# Patient Record
Sex: FEMALE | Race: BLACK OR AFRICAN AMERICAN | Employment: OTHER | URBAN - METROPOLITAN AREA
[De-identification: names, ages, dates, MRNs, and addresses within clinical notes are randomized per-mention and may not be internally consistent; named-entity substitution may affect disease eponyms.]

---

## 2017-06-08 PROBLEM — N32.81 OAB (OVERACTIVE BLADDER): Status: ACTIVE | Noted: 2017-06-08

## 2019-02-20 ENCOUNTER — HOSPITAL ENCOUNTER (OUTPATIENT)
Dept: LAB | Age: 39
Discharge: HOME | End: 2019-02-20
Payer: COMMERCIAL

## 2019-02-20 PROCEDURE — 88305 TISSUE EXAM BY PATHOLOGIST: CPT

## 2022-03-16 ENCOUNTER — OFFICE VISIT (OUTPATIENT)
Dept: NEUROLOGY | Age: 42
End: 2022-03-16
Payer: COMMERCIAL

## 2022-03-16 VITALS
BODY MASS INDEX: 36.25 KG/M2 | HEART RATE: 106 BPM | HEIGHT: 62 IN | WEIGHT: 197 LBS | OXYGEN SATURATION: 98 % | DIASTOLIC BLOOD PRESSURE: 80 MMHG | RESPIRATION RATE: 18 BRPM | SYSTOLIC BLOOD PRESSURE: 120 MMHG

## 2022-03-16 DIAGNOSIS — Z86.69 H/O IMPACTED CERUMEN: ICD-10-CM

## 2022-03-16 DIAGNOSIS — R51.9 HEADACHE DISORDER: Primary | ICD-10-CM

## 2022-03-16 PROCEDURE — 99203 OFFICE O/P NEW LOW 30 MIN: CPT | Performed by: NURSE PRACTITIONER

## 2022-03-16 RX ORDER — LOSARTAN POTASSIUM 50 MG/1
TABLET ORAL
COMMUNITY
Start: 2022-01-29

## 2022-03-16 RX ORDER — AMLODIPINE BESYLATE 5 MG/1
TABLET ORAL
COMMUNITY
Start: 2022-01-29

## 2022-03-16 RX ORDER — AMLODIPINE BESYLATE 5 MG/1
1 TABLET ORAL DAILY
COMMUNITY
End: 2022-03-16 | Stop reason: SDUPTHER

## 2022-03-16 NOTE — PROGRESS NOTES
[employer]  [address] [facility], [address], [facility], [address]  [address] Suresh Molina, [address]  Office:  [phone]  Fax: [phone]    Referring: [facility]    Chief Complaint   Patient presents with    Headache    New Patient       HPI:    This is a 43year old female who presents for new patient evaluation headaches. Onset of headache began December 27th. Denies inciting factor or injury. She presented to the ER shortly afterwards and had negative workup. Was told this was likely viral related. At that time she had a negative COVID test. Reports having a normal eye exam. She then went to ENT where she was found to have cerumen impaction. This was causing some imbalance. After removal she began to feel better. Headaches eased up and then returned. She eventually re tested for COVID and this was positive in January. She saw her primary car provider who offered a neurology referral. Today she endorses being headache free. Denies history of headaches. She was riding in the car coming back from MD and started to feel dizzy. She took Meclizine with no improvement. Denied headaches, visual disturbances, ringing in the ears, hearing loss, numbness, or weakness. Overall she feels well. She takes Aleve as needed for sporadic headaches with improvement. She takes hydroxyzine for itch. This also helps with sleep. No reported depression/anxiety. Denies tobacco use. No other concerns at this time.       Social History     Socioeconomic History    Marital status:      Spouse name: Not on file    Number of children: Not on file    Years of education: Not on file    Highest education level: Not on file   Occupational History    Not on file   Tobacco Use    Smoking status: Never Smoker    Smokeless tobacco: Never Used   Substance and Sexual Activity    Alcohol use: No    Drug use: No    Sexual activity: Yes     Partners: Male   Other Topics Concern    Not on file   Social History Narrative    Not on file     Social Determinants of Health     Financial Resource Strain:     Difficulty of Paying Living Expenses: Not on file   Food Insecurity:     Worried About Running Out of Food in the Last Year: Not on file    Bessie of Food in the Last Year: Not on file   Transportation Needs:     Lack of Transportation (Medical): Not on file    Lack of Transportation (Non-Medical):  Not on file   Physical Activity:     Days of Exercise per Week: Not on file    Minutes of Exercise per Session: Not on file   Stress:     Feeling of Stress : Not on file   Social Connections:     Frequency of Communication with Friends and Family: Not on file    Frequency of Social Gatherings with Friends and Family: Not on file    Attends Ca[employer] Services: Not on file    Active Member of [address] [place] [address] [employer] or Organizations: Not on file    Attends Club or Organization Meetings: Not on file    Marital Status: Not on file   Intimate Partner Violence:     Fear of Current or Ex-Partner: Not on file    Emotionally Abused: Not on file    Physically Abused: Not on file    Sexually Abused: Not on file   Housing Stability:     Unable to Pay for Housing in the Last Year: Not on file    Number of [place] in the Last Year: Not on file    Unstable Housing in the Last Year: Not on file       Family History   Problem Relation Age of Onset    Hypertension Mother     Hypertension Father        Current Outpatient Medications   Medication Sig Dispense Refill    amLODIPine (NORVASC) 5 mg tablet take 1 tablet by mouth once daily 90      losartan (COZAAR) 50 mg tablet take 1 tablet by mouth once daily for blood pressure 90         Past Medical History:   Diagnosis Date    Dysuria     Frequency of micturition     Hypertension     Incomplete bladder emptying     Microhematuria     Nocturia     OAB (overactive bladder)     Urgency of urination     Urinary frequency        Past Surgical History: Procedure Laterality Date    HX GYN      c section; termination    HX HEENT      WISDOM TEETH EXTRACTED       Allergies   Allergen Reactions    Eggshell Membrane Anaphylaxis    Seafood Hives       Patient Active Problem List   Diagnosis Code    OAB (overactive bladder) N32.81    Urinary frequency R35.0    Hypertension I10    Dysuria R30.0    Frequency of micturition R35.0    Nocturia R35.1    Urgency of urination R39.15         Review of Systems:   Constitutional: no fever or chills  Skin denies rash or itching  HEENT:  Denies tinnitus, hearing loss, or visual changes  Respiratory: denies shortness of breath  Cardiovascular: denies chest pain, dyspnea on exertion  Gastrointestinal: does not report nausea or vomiting  Genitourinary: does not report dysuria or incontinence  Musculoskeletal: does not report joint pain or swelling  Endocrine: denies weight change  Hematology: denies easy bruising or bleeding   Neurological: as above in HPI      PHYSICAL EXAMINATION:      VITAL SIGNS:    Visit Vitals  /80   Pulse (!) 106   Resp 18   Ht 5' 2\" (1.575 m)   Wt 89.4 kg (197 lb)   SpO2 98%   BMI 36.03 kg/m²       GENERAL: Well developed, well nourished, in no apparent distress. HEART: RR, no murmurs heard, no carotid bruits  LUNGS:                      CTAB  EXTREMITIES: No clubbing, cyanosis, or edema is identified. Pulses 2+ and symmetrical.  HEAD:   Normocephalic, atraumatic. NEUROLOGIC EXAMINATION    MENTAL STATUS: Awake, alert, and oriented x 4. Attention and STM are grossly normal. There is no aphasia. Fund of knowledge is adequate. Mood and affect are appropriate  CRANIAL NERVES: Visual fields are full to confrontation. No fundus anomalies observed. Pupils are reactive to light and accommodation. Extraocular movements are intact and there is no nystagmus. Facial sensation is normal  Face is symmetrical.   Hearing is grossly intact. SCM/TPZ 5/5  Palate rises symmetrically.  Tongue is in the midline. MOTOR:   Normal tone, bulk, and strength, 5/5 muscle strength throughout. No cogwheel rigidity or clonus present. CEREBELLAR: Finger to nose was normal.   No tremors or dysmetria    SENSORY:  Grossly unremarkable. DTR's:   +2 throughout, toes downgoing     GAIT:   Normal gait, able to tandem walk        I have personally reviewed imaging studies. ED CT HEAD NO CONTRAST    Anatomical Region Laterality Modality   Head -- Computed Tomography       Impression  Performed by RADIOLOGY    Unremarkable CT scan of the head without contrast.     Signed By: Azeb Rodriguez MD on 1/19/2022 12:44 PM      Impression/Plan  Gianfranco Jackson is a 43 y.o. female whose history and physical are consistent with headache disorder likely secondary to virus. Here for new patient evaluation of headaches. Denies headaches today. She had negative workup including unremarkable head CT. Her exam in non focal.  No history of headaches previous to end of December. At this time no further workup or medication adjustments. BP controlled today at 120/80. She will follow up with her PCP as indicated. She will follow up on an as needed basis. All questions addressed and patient is agreeable with plan of care. Diagnoses and all orders for this visit:    1. Headache disorder    2. H/O impacted cerumen        I spent 35 minutes with the patient in face-to-face consultation, with 20 minutes spent in counseling and coordination of care as described above. Signed By: Sierra Mera NP              PLEASE NOTE:   Portions of this document may have been produced using voice recognition software. Unrecognized errors in transcription may be present.

## 2022-03-16 NOTE — PROGRESS NOTES
Mara Figueroa presents today for   Chief Complaint   Patient presents with    Headache    New Patient       Is someone accompanying this pt? no    Is the patient using any DME equipment during OV? no    Depression Screening:  No flowsheet data found. Learning Assessment:  No flowsheet data found. Abuse Screening:  No flowsheet data found. Fall Risk  No flowsheet data found. Coordination of Care:  1. Have you been to the ER, urgent care clinic since your last visit? Hospitalized since your last visit? no    2. Have you seen or consulted any other health care providers outside of the [address] [place] [address] since your last visit? Include any pap smears or colon screening.  pradeep

## 2022-03-20 PROBLEM — N32.81 OAB (OVERACTIVE BLADDER): Status: ACTIVE | Noted: 2017-06-08

## 2023-08-01 ENCOUNTER — OFFICE VISIT (OUTPATIENT)
Age: 43
End: 2023-08-01
Payer: COMMERCIAL

## 2023-08-01 VITALS
SYSTOLIC BLOOD PRESSURE: 126 MMHG | HEIGHT: 62 IN | RESPIRATION RATE: 18 BRPM | WEIGHT: 206.4 LBS | HEART RATE: 84 BPM | DIASTOLIC BLOOD PRESSURE: 78 MMHG | OXYGEN SATURATION: 98 % | TEMPERATURE: 98.4 F | BODY MASS INDEX: 37.98 KG/M2

## 2023-08-01 DIAGNOSIS — R56.9 SEIZURE (HCC): ICD-10-CM

## 2023-08-01 DIAGNOSIS — G47.30 SLEEP DISORDER BREATHING: Primary | ICD-10-CM

## 2023-08-01 DIAGNOSIS — R20.2 PARESTHESIAS: ICD-10-CM

## 2023-08-01 PROCEDURE — 99203 OFFICE O/P NEW LOW 30 MIN: CPT | Performed by: PSYCHIATRY & NEUROLOGY

## 2023-08-01 PROCEDURE — 3078F DIAST BP <80 MM HG: CPT | Performed by: PSYCHIATRY & NEUROLOGY

## 2023-08-01 PROCEDURE — 3074F SYST BP LT 130 MM HG: CPT | Performed by: PSYCHIATRY & NEUROLOGY

## 2023-08-01 NOTE — PROGRESS NOTES
HISTORY AND EXAM  37year old female after the the [facility] in 2021 feel pressure in head, was bad before but not gone away, was seen by neurology in 2021 for that but was symptom free that time, after that started again with headaches for few minutes without associated symptoms, sometimes get peppery taste in mouth happened 3 times but lasted for few hours, have reflux problem as well, patient unable to describe her feeling but something very odd,  patient denies any other neurological complains, no migraine features, have motion sickness when move, patient get this weird feeling comes and goes that she is unable to describe but very much worried since never had before, snore but sleep okay, hav some sinus infection. Social History     Socioeconomic History    Marital status:      Spouse name: Not on file    Number of children: Not on file    Years of education: Not on file    Highest education level: Not on file   Occupational History    Not on file   Tobacco Use    Smoking status: Never    Smokeless tobacco: Never   Substance and Sexual Activity    Alcohol use: No    Drug use: No    Sexual activity: Not on file   Other Topics Concern    Not on file   Social History Narrative    Not on file     Social Determinants of Health     Financial Resource Strain: Not on file   Food Insecurity: Not on file   Transportation Needs: Not on file   Physical Activity: Not on file   Stress: Not on file   Social Connections: Not on file   Intimate Partner Violence: Not on file   Housing Stability: Not on file       Family History   Problem Relation Age of Onset    Hypertension Father     Hypertension Mother         Current Outpatient Medications   Medication Sig Dispense Refill    amLODIPine (NORVASC) 5 MG tablet take 1 tablet by mouth once daily 90      losartan (COZAAR) 50 MG tablet take 1 tablet by mouth once daily for blood pressure 90       No current facility-administered medications for this visit.        Past Medical

## 2023-08-14 ENCOUNTER — HOSPITAL ENCOUNTER (OUTPATIENT)
Facility: HOSPITAL | Age: 43
Discharge: HOME | End: 2023-08-17
Attending: PSYCHIATRY & NEUROLOGY
Payer: COMMERCIAL

## 2023-08-14 DIAGNOSIS — R20.2 PARESTHESIAS: ICD-10-CM

## 2023-08-14 PROCEDURE — 70551 MRI BRAIN STEM W/O DYE: CPT

## 2023-08-22 ENCOUNTER — TELEPHONE (OUTPATIENT)
Age: 43
End: 2023-08-22

## 2023-08-23 ENCOUNTER — PROCEDURE VISIT (OUTPATIENT)
Age: 43
End: 2023-08-23
Payer: COMMERCIAL

## 2023-08-23 VITALS
HEART RATE: 99 BPM | OXYGEN SATURATION: 99 % | WEIGHT: 206.6 LBS | TEMPERATURE: 97.9 F | HEIGHT: 62 IN | RESPIRATION RATE: 18 BRPM | BODY MASS INDEX: 38.02 KG/M2

## 2023-08-23 DIAGNOSIS — R20.0 NUMBNESS AND TINGLING OF BOTH UPPER EXTREMITIES: Primary | ICD-10-CM

## 2023-08-23 DIAGNOSIS — G56.03 BILATERAL CARPAL TUNNEL SYNDROME: ICD-10-CM

## 2023-08-23 DIAGNOSIS — R20.2 NUMBNESS AND TINGLING OF BOTH UPPER EXTREMITIES: Primary | ICD-10-CM

## 2023-08-23 DIAGNOSIS — R94.131 ABNORMAL EMG: ICD-10-CM

## 2023-08-23 PROBLEM — S82.842A CLOSED BIMALLEOLAR FRACTURE OF LEFT ANKLE: Status: ACTIVE | Noted: 2023-03-06

## 2023-08-23 PROCEDURE — 95912 NRV CNDJ TEST 11-12 STUDIES: CPT | Performed by: PHYSICAL MEDICINE & REHABILITATION

## 2023-08-23 PROCEDURE — 95886 MUSC TEST DONE W/N TEST COMP: CPT | Performed by: PHYSICAL MEDICINE & REHABILITATION

## 2023-08-23 RX ORDER — ERGOCALCIFEROL 1.25 MG/1
CAPSULE ORAL
COMMUNITY
Start: 2023-08-09

## 2023-08-23 RX ORDER — ASPIRIN 325 MG
325 TABLET ORAL DAILY
COMMUNITY
Start: 2023-03-09

## 2023-08-23 RX ORDER — NORETHINDRONE 0.35 MG/1
1 TABLET ORAL DAILY
COMMUNITY
Start: 2023-07-26

## 2023-08-23 RX ORDER — OMEPRAZOLE 20 MG/1
CAPSULE, DELAYED RELEASE ORAL
COMMUNITY
Start: 2022-07-22

## 2023-08-23 RX ORDER — FLUTICASONE PROPIONATE 50 MCG
1 SPRAY, SUSPENSION (ML) NASAL DAILY
COMMUNITY
Start: 2023-06-21

## 2023-08-23 RX ORDER — LOSARTAN POTASSIUM 25 MG/1
25 TABLET ORAL DAILY
COMMUNITY
Start: 2023-08-01

## 2023-08-23 NOTE — PROGRESS NOTES
4.0 31  > 11 Wrist-Dig V 14 58 -    Right Ulnar Sensory   Wrist-Dig V 2.2  < 3.1 3.2  < 4.0 33  > 11 Wrist-Dig V 14 64 -      Inter-Nerve Comparisons     Nerve 1 Value 1 Nerve 2 Value 2 Parameter Result Normal   Sensory Sites   L Median Wrist-Dig IV 3.7 ms L Ulnar Wrist-Dig IV 2.9 ms Peak Lat Diff 0.80 ms <0.40     EMG+     Side Muscle Nerve Root Ins Act Fibs Psw Fascics Other Amp Dur Poly Recrt Activation Comment Misc   Right Biceps Musculocut C5-C6 Nml Nml Nml Nml 0 Nml Nml 0 Nml Nml     Right Triceps Radial C6-C8 Nml Nml Nml Nml 0 Nml Nml 0 Nml Nml     Right Pronator Teres Median C6-C7 Nml Nml Nml Nml 0 Nml Nml 0 Nml Nml     Right FDI Median,  Ulnar C8-T1 Nml Nml Nml Nml 0 Nml Nml 0 Nml Nml     Right APB Median C8-T1 Nml Nml Nml Nml 0 Nml Nml 0 Nml Nml     Left Biceps Musculocut C5-C6 Nml Nml Nml Nml 0 Nml Nml 0 Nml Nml     Left Triceps Radial C6-C8 Nml Nml Nml Nml 0 Nml Nml 0 Nml Nml     Left Pronator Teres Median C6-C7 Nml Nml Nml Nml 0 Nml Nml 0 Nml Nml     Left FDI Median,  Ulnar C8-T1 Nml Nml Nml Nml 0 Nml Nml 0 Nml Nml     Left APB Median C8-T1 Nml Nml Nml Nml 0 Nml Nml 0 Nml Nml     Left Cervical Parasp (Upper) Rami C1-C3 Nml Nml Nml Nml Poor Relax          Left Cervical Parasp (Mid) Rami C4-C6 Nml Nml Nml Nml Poor Relax          Left Cervical Parasp (Lower) Rami C7-C8 Nml Nml Nml Nml Poor Relax          Right Cervical Parasp (Upper) Rami C1-C3 Nml Nml Nml Nml Poor Relax          Right Cervical Parasp (Mid) Rami C4-C6 Nml Nml Nml Nml Poor Relax          Right Cervical Parasp (Lower) Rami C7-C8 Nml Nml Nml Nml Poor Relax                  Waveforms:    Motor                Sensory                               [facility]  OFFICE PROCEDURE PROGRESS NOTE        Chart reviewed for the following:   I, Ross Pina, have reviewed the History, Physical and updated the Allergic reactions for Rosalee Hatch     TIME OUT performed immediately prior to start of procedure:   I,